# Patient Record
Sex: FEMALE | Race: OTHER | Employment: OTHER | URBAN - METROPOLITAN AREA
[De-identification: names, ages, dates, MRNs, and addresses within clinical notes are randomized per-mention and may not be internally consistent; named-entity substitution may affect disease eponyms.]

---

## 2018-05-01 NOTE — PATIENT DISCUSSION
CATARACTS, OS - NOT VISUALLY SIGNIFICANT. SPECTACLE RX GIVEN TO IMPROVE VISION IN THE RIGHT EYE, FOLLOW.

## 2022-06-28 ENCOUNTER — NEW PATIENT (OUTPATIENT)
Dept: URBAN - METROPOLITAN AREA CLINIC 45 | Facility: CLINIC | Age: 57
End: 2022-06-28

## 2022-06-28 DIAGNOSIS — H52.4: ICD-10-CM

## 2022-06-28 PROCEDURE — 92004 COMPRE OPH EXAM NEW PT 1/>: CPT

## 2022-06-28 PROCEDURE — 92015 DETERMINE REFRACTIVE STATE: CPT

## 2022-06-28 ASSESSMENT — VISUAL ACUITY
OU_SC: J2
OD_SC: 20/15-2
OS_SC: 20/15-2
OU_SC: 20/15
OU_CC: J1

## 2022-06-28 ASSESSMENT — TONOMETRY
OD_IOP_MMHG: 13
OS_IOP_MMHG: 13

## 2024-02-27 ENCOUNTER — COMPREHENSIVE EXAM (OUTPATIENT)
Dept: URBAN - METROPOLITAN AREA CLINIC 43 | Facility: CLINIC | Age: 59
End: 2024-02-27

## 2024-02-27 DIAGNOSIS — H52.4: ICD-10-CM

## 2024-02-27 PROCEDURE — 92014 COMPRE OPH EXAM EST PT 1/>: CPT

## 2024-02-27 PROCEDURE — 92015 DETERMINE REFRACTIVE STATE: CPT

## 2024-02-27 ASSESSMENT — VISUAL ACUITY
OU_SC: 20/15
OD_SC: 20/15
OS_CC: 20/15-2
OU_CC: J1
OS_CC: J1
OD_CC: J2
OD_CC: 20/15
OD_SC: J8
OU_CC: 20/15
OS_SC: 20/15
OU_SC: J3
OS_SC: J4+

## 2024-02-27 ASSESSMENT — TONOMETRY
OS_IOP_MMHG: 13
OD_IOP_MMHG: 13